# Patient Record
Sex: FEMALE | Race: BLACK OR AFRICAN AMERICAN | NOT HISPANIC OR LATINO | URBAN - METROPOLITAN AREA
[De-identification: names, ages, dates, MRNs, and addresses within clinical notes are randomized per-mention and may not be internally consistent; named-entity substitution may affect disease eponyms.]

---

## 2019-02-13 ENCOUNTER — APPOINTMENT (RX ONLY)
Dept: URBAN - METROPOLITAN AREA CLINIC 51 | Facility: CLINIC | Age: 19
Setting detail: DERMATOLOGY
End: 2019-02-13

## 2019-02-13 ENCOUNTER — APPOINTMENT (RX ONLY)
Dept: URBAN - METROPOLITAN AREA CLINIC 52 | Facility: CLINIC | Age: 19
Setting detail: DERMATOLOGY
End: 2019-02-13

## 2019-02-13 DIAGNOSIS — L20.89 OTHER ATOPIC DERMATITIS: ICD-10-CM

## 2019-02-13 PROBLEM — L70.0 ACNE VULGARIS: Status: ACTIVE | Noted: 2019-02-13

## 2019-02-13 PROBLEM — L20.84 INTRINSIC (ALLERGIC) ECZEMA: Status: ACTIVE | Noted: 2019-02-13

## 2019-02-13 PROCEDURE — ? COUNSELING

## 2019-02-13 PROCEDURE — ? PRESCRIPTION

## 2019-02-13 PROCEDURE — 99202 OFFICE O/P NEW SF 15 MIN: CPT

## 2019-02-13 RX ORDER — MOMETASONE FUROATE 1 MG/G
CREAM TOPICAL
Qty: 1 | Refills: 1 | Status: ERX | COMMUNITY
Start: 2019-02-13

## 2019-02-13 RX ADMIN — MOMETASONE FUROATE: 1 CREAM TOPICAL at 19:17

## 2019-02-13 ASSESSMENT — LOCATION DETAILED DESCRIPTION DERM
LOCATION DETAILED: LEFT CHIN
LOCATION DETAILED: RIGHT NASAL SIDEWALL
LOCATION DETAILED: LEFT NASAL SIDEWALL
LOCATION DETAILED: LEFT CHIN
LOCATION DETAILED: RIGHT NASAL SIDEWALL
LOCATION DETAILED: LEFT NASAL SIDEWALL

## 2019-02-13 ASSESSMENT — LOCATION SIMPLE DESCRIPTION DERM
LOCATION SIMPLE: LEFT NOSE
LOCATION SIMPLE: RIGHT NOSE
LOCATION SIMPLE: CHIN
LOCATION SIMPLE: RIGHT NOSE
LOCATION SIMPLE: CHIN
LOCATION SIMPLE: LEFT NOSE

## 2019-02-13 ASSESSMENT — LOCATION ZONE DERM
LOCATION ZONE: FACE
LOCATION ZONE: FACE
LOCATION ZONE: NOSE
LOCATION ZONE: NOSE

## 2019-02-13 NOTE — HPI: DISCOLORATION
How Severe Is Your Skin Discoloration?: moderate
Additional History: Scaly and peeling. Does not itch. Washes face BID. Uses facial scrub twice weekly.

## 2020-07-29 ENCOUNTER — OFFICE VISIT (OUTPATIENT)
Dept: DERMATOLOGY | Facility: CLINIC | Age: 20
End: 2020-07-29
Payer: COMMERCIAL

## 2020-07-29 DIAGNOSIS — L71.0 PERIORAL DERMATITIS: Primary | ICD-10-CM

## 2020-07-29 PROCEDURE — 99201 PR OFFICE/OUTPT VISIT,NEW,LEVL I: ICD-10-PCS | Mod: S$GLB,,, | Performed by: DERMATOLOGY

## 2020-07-29 PROCEDURE — 99999 PR PBB SHADOW E&M-NEW PATIENT-LVL II: ICD-10-PCS | Mod: PBBFAC,,, | Performed by: DERMATOLOGY

## 2020-07-29 PROCEDURE — 99201 PR OFFICE/OUTPT VISIT,NEW,LEVL I: CPT | Mod: S$GLB,,, | Performed by: DERMATOLOGY

## 2020-07-29 PROCEDURE — 99999 PR PBB SHADOW E&M-NEW PATIENT-LVL II: CPT | Mod: PBBFAC,,, | Performed by: DERMATOLOGY

## 2020-07-29 RX ORDER — DOXYCYCLINE 100 MG/1
CAPSULE ORAL
Qty: 30 CAPSULE | Refills: 1 | Status: SHIPPED | OUTPATIENT
Start: 2020-07-29 | End: 2022-03-21

## 2020-07-29 RX ORDER — METRONIDAZOLE 7.5 MG/G
GEL TOPICAL
Qty: 45 G | Refills: 3 | Status: SHIPPED | OUTPATIENT
Start: 2020-07-29 | End: 2022-03-21

## 2020-07-29 NOTE — PROGRESS NOTES
Subjective:       Patient ID:  Ginny Cohen is a 19 y.o. female who presents for   Chief Complaint   Patient presents with    Acne     cheek , mouth , x weeks      Hx of perioral dermatitis    History of Present Illness: The patient presents with chief complaint of acne.  Location: cheeks and mouth   Duration: weeks  Signs/Symptoms: red pimples     Prior treatments: none      Review of Systems   Constitutional: Negative for fever and chills.   Gastrointestinal: Negative for nausea and vomiting.   Skin: Positive for itching and rash. Negative for daily sunscreen use, activity-related sunscreen use and recent sunburn.   Hematologic/Lymphatic: Does not bruise/bleed easily.        Objective:    Physical Exam   Constitutional: She appears well-developed and well-nourished. No distress.   Neurological: She is alert and oriented to person, place, and time. She is not disoriented.   Psychiatric: She has a normal mood and affect.   Skin:   Areas Examined (abnormalities noted in diagram):   Head / Face Inspection Performed  Neck Inspection Performed  RUE Inspected  LUE Inspection Performed  Nails and Digits Inspection Performed                Assessment / Plan:        Perioral dermatitis  -     metroNIDAZOLE (METROGEL) 0.75 % gel; AAA bid prn  Dispense: 45 g; Refill: 3  -     doxycycline (MONODOX) 100 MG capsule; Take once daily with food.  May cause upset stomach.  Dispense: 30 capsule; Refill: 1  -     Discussed change to fluoride free toothpaste. Will start above med.    Side effect profile of doxy reviewed including increased sun sensitivity and upset stomach.  Patient was instructed to not become pregnant while on medication to effects on dental development in fetus; she acknowledged understanding of risks involved.            Follow up in about 4 weeks (around 8/26/2020) for follow up with LEONARDO Polk.

## 2022-03-04 PROBLEM — N92.6 LATE MENSES: Status: ACTIVE | Noted: 2022-03-04

## 2022-03-04 PROBLEM — R10.31 RIGHT LOWER QUADRANT PAIN: Status: ACTIVE | Noted: 2022-03-04

## 2022-03-21 PROBLEM — N92.6 LATE MENSES: Status: RESOLVED | Noted: 2022-03-04 | Resolved: 2022-03-21

## 2022-03-21 PROBLEM — N92.6 IRREGULAR MENSES: Status: ACTIVE | Noted: 2022-03-21

## 2022-05-04 PROBLEM — N92.6 IRREGULAR MENSES: Status: RESOLVED | Noted: 2022-03-21 | Resolved: 2022-05-04

## 2025-05-19 NOTE — HPI: DISCOLORATION
How Severe Is Your Skin Discoloration?: moderate
Additional History: Scaly and peeling. Does not itch. Washes face BID. Uses facial scrub twice weekly.
18-May-2025 23:37